# Patient Record
Sex: FEMALE | Race: WHITE | NOT HISPANIC OR LATINO | Employment: FULL TIME | URBAN - METROPOLITAN AREA
[De-identification: names, ages, dates, MRNs, and addresses within clinical notes are randomized per-mention and may not be internally consistent; named-entity substitution may affect disease eponyms.]

---

## 2017-04-07 ENCOUNTER — HOSPITAL ENCOUNTER (OUTPATIENT)
Dept: RADIOLOGY | Facility: CLINIC | Age: 22
Discharge: HOME | End: 2017-04-07
Attending: FAMILY MEDICINE

## 2017-04-07 ENCOUNTER — TRANSCRIBE ORDERS (OUTPATIENT)
Dept: URGENT CARE | Facility: CLINIC | Age: 22
End: 2017-04-07

## 2017-04-07 DIAGNOSIS — S61.011A LACERATION OF RIGHT THUMB, INITIAL ENCOUNTER: Primary | ICD-10-CM

## 2017-04-07 DIAGNOSIS — S61.011A LACERATION OF RIGHT THUMB, INITIAL ENCOUNTER: ICD-10-CM

## 2017-04-07 PROCEDURE — 73140 X-RAY EXAM OF FINGER(S): CPT

## 2020-01-30 ENCOUNTER — EVALUATION (OUTPATIENT)
Dept: PHYSICAL THERAPY | Facility: CLINIC | Age: 25
End: 2020-01-30
Payer: COMMERCIAL

## 2020-01-30 DIAGNOSIS — S82.111K CLOSED DISPLACED FRACTURE OF RIGHT TIBIAL SPINE WITH NONUNION: Primary | ICD-10-CM

## 2020-01-30 PROCEDURE — 97535 SELF CARE MNGMENT TRAINING: CPT | Performed by: PHYSICAL THERAPIST

## 2020-01-30 PROCEDURE — 97161 PT EVAL LOW COMPLEX 20 MIN: CPT | Performed by: PHYSICAL THERAPIST

## 2020-01-30 PROCEDURE — 97110 THERAPEUTIC EXERCISES: CPT | Performed by: PHYSICAL THERAPIST
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
## 2020-02-03 ENCOUNTER — OFFICE VISIT (OUTPATIENT)
Dept: PHYSICAL THERAPY | Facility: CLINIC | Age: 25
End: 2020-02-03
Payer: COMMERCIAL

## 2020-02-03 DIAGNOSIS — S82.111K CLOSED DISPLACED FRACTURE OF RIGHT TIBIAL SPINE WITH NONUNION: Primary | ICD-10-CM

## 2020-02-03 PROCEDURE — 97140 MANUAL THERAPY 1/> REGIONS: CPT | Performed by: PHYSICAL THERAPIST

## 2020-02-03 PROCEDURE — 97110 THERAPEUTIC EXERCISES: CPT | Performed by: PHYSICAL THERAPIST

## 2020-02-03 NOTE — PROGRESS NOTES
Daily Note     Today's date: 2/3/2020  Patient name: Jairo Roa  : 1995  MRN: [id number]  Referring provider: Marcia Calvin  Dx: No diagnosis found  Subjective: The patient returns after IE reporting compliance with her HEP  She denies any pain but does report soreness after IE and today  She also noted a decrease in swelling since initiating her exercise program        Objective: See treatment diary below      Assessment: The patient demonstrated good tolerance to exercises  Moderate tightness noted into dorsiflexion  Plan: Continue per plan of care        Precautions: R LE NWB and no strengthening per MD      Manual  1/30 2/3           R Ankle PROM  10 min                                                                   Exercise Diary  1/30 2/3           ABCs 1 set 2 sets           PF/DF AROM 1x20 1x20           Ev/Inv AROM 1x20 1x20           [address]   20"x4 20"x4           [address]    20"x4           PF Stretch 20"x4 20"x4           WB: DF/PF  1x30 ea           WB: Ev/Inv  1x30 ea           Towel Scrunches  np                                                                                                                                                              Modalities

## 2020-02-06 ENCOUNTER — OFFICE VISIT (OUTPATIENT)
Dept: PHYSICAL THERAPY | Facility: CLINIC | Age: 25
End: 2020-02-06
Payer: COMMERCIAL

## 2020-02-06 DIAGNOSIS — S82.111K CLOSED DISPLACED FRACTURE OF RIGHT TIBIAL SPINE WITH NONUNION: Primary | ICD-10-CM

## 2020-02-06 PROCEDURE — 97140 MANUAL THERAPY 1/> REGIONS: CPT | Performed by: PHYSICAL THERAPIST

## 2020-02-06 PROCEDURE — 97110 THERAPEUTIC EXERCISES: CPT | Performed by: PHYSICAL THERAPIST

## 2020-02-06 NOTE — PROGRESS NOTES
Daily Note     Today's date: 2020  Patient name: Dereck Fletcher  : 1995  MRN: [id number]  Referring provider: Suzan Bay  Dx: No diagnosis found  Subjective: The patient reports no increase in symptoms following last session  Her MD follow up was pushed back one week due to the patient not having a ride  Objective: See treatment diary below      Assessment: The patient good knowledge of TE program  Slightly improved PROM noted today  Plan: Continue per plan of care        Precautions: R LE NWB and no strengthening per MD      Manual  1/30 2/3 2/6          R Ankle PROM  10 min 10 min                                                                  Exercise Diary  1/30 2/3 2/6          ABCs 1 set 2 sets 2 sets          PF/DF AROM 1x20 1x20 1x30          Ev/Inv AROM 1x20 1x20 1x30          [address]   20"x4 20"x4 20"x4          [address]    20"x4 20"x4          PF Stretch 20"x4 20"x4 20"x4          WB: DF/PF  1x30 ea 1x30 ea          WB: Ev/Inv  1x30 ea 1x30 ea          Towel Scrunches  np 1x30                                                                                                                                                             Modalities

## 2020-02-10 ENCOUNTER — OFFICE VISIT (OUTPATIENT)
Dept: PHYSICAL THERAPY | Facility: CLINIC | Age: 25
End: 2020-02-10
Payer: COMMERCIAL

## 2020-02-10 DIAGNOSIS — S82.111K CLOSED DISPLACED FRACTURE OF RIGHT TIBIAL SPINE WITH NONUNION: Primary | ICD-10-CM

## 2020-02-10 PROCEDURE — 97110 THERAPEUTIC EXERCISES: CPT | Performed by: PHYSICAL THERAPIST

## 2020-02-10 NOTE — PROGRESS NOTES
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 S82 111K                   Subjective: The patient reports no new complaints  Objective: See treatment diary below      Assessment: The patient demonstrates slowly improving PROM but is still most limited into ankle dorsiflexion  Plan: Continue per plan of care        Precautions: R LE NWB and no strengthening per MD      Manual  1/30 2/3 2/6 2/10         R Ankle PROM  10 min 10 min 10 min                                                                 Exercise Diary  1/30 2/3 2/6 2/10         ABCs 1 set 2 sets 2 sets 2 sets         PF/DF AROM 1x20 1x20 1x30 1x30         Ev/Inv AROM 1x20 1x20 1x30 1x30         [address]   20"x4 20"x4 20"x4 20"x4         [address]    20"x4 20"x4 20"x4         PF Stretch 20"x4 20"x4 20"x4 20"x4         WB: DF/PF  1x30 ea 1x30 ea 1x30 ea         WB: Ev/Inv  1x30 ea 1x30 ea 1x30 ea         Towel Scrunches  np 1x30 1x40                                                                                                                                                            Modalities

## 2020-02-13 ENCOUNTER — OFFICE VISIT (OUTPATIENT)
Dept: PHYSICAL THERAPY | Facility: CLINIC | Age: 25
End: 2020-02-13
Payer: COMMERCIAL

## 2020-02-13 DIAGNOSIS — S82.111K CLOSED DISPLACED FRACTURE OF RIGHT TIBIAL SPINE WITH NONUNION: Primary | ICD-10-CM

## 2020-02-13 PROCEDURE — 97140 MANUAL THERAPY 1/> REGIONS: CPT

## 2020-02-13 PROCEDURE — 97110 THERAPEUTIC EXERCISES: CPT

## 2020-02-13 NOTE — PROGRESS NOTES
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 S82 111K                   Subjective: The patient occasional right ankle soreness, no pain currently  Objective: See treatment diary below      Assessment: Pt demonstrated moderate limited ankle DF PROM, good tolerance to tx  Plan: Continue poc as per PT       Precautions: R LE NWB and no strengthening per MD      Manual  1/30 2/3 2/6 2/10 2/13        R Ankle PROM  10 min 10 min 10 min 10 min                                                                Exercise Diary  1/30 2/3 2/6 2/10 2/13        ABCs 1 set 2 sets 2 sets 2 sets 2 sets        PF/DF AROM 1x20 1x20 1x30 1x30 1x30        Ev/Inv AROM 1x20 1x20 1x30 1x30 1x30        [address]   20"x4 20"x4 20"x4 20"x4 20"x4        [address]    20"x4 20"x4 20"x4 20"x4        PF Stretch 20"x4 20"x4 20"x4 20"x4 20"x4        WB: DF/PF  1x30 ea 1x30 ea 1x30 ea 1x30 ea        WB: Ev/Inv  1x30 ea 1x30 ea 1x30 ea 1x30 ea        Towel Scrunches  np 1x30 1x40 1x50                                                                                                                                                           Modalities

## 2020-02-17 ENCOUNTER — OFFICE VISIT (OUTPATIENT)
Dept: PHYSICAL THERAPY | Facility: CLINIC | Age: 25
End: 2020-02-17
Payer: COMMERCIAL

## 2020-02-17 DIAGNOSIS — S82.111K CLOSED DISPLACED FRACTURE OF RIGHT TIBIAL SPINE WITH NONUNION: Primary | ICD-10-CM

## 2020-02-17 PROCEDURE — 97110 THERAPEUTIC EXERCISES: CPT | Performed by: PHYSICAL THERAPIST

## 2020-02-17 NOTE — PROGRESS NOTES
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 S82 111K                   Subjective: The patient denies any soreness or complaints concerning the ankle today or over the weekend  F/u appointment is in two weeks  Objective: See treatment diary below      Assessment: Pt demonstrated continued limitations into ankle DF  Plan: Continue poc       Precautions: R LE NWB and no strengthening per MD      Manual  1/30 2/3 2/6 2/10 2/13 2/17       R Ankle PROM  10 min 10 min 10 min 10 min 10 min                                                               Exercise Diary  1/30 2/3 2/6 2/10 2/13 2/17       ABCs 1 set 2 sets 2 sets 2 sets 2 sets 2 sets       PF/DF AROM 1x20 1x20 1x30 1x30 1x30 1x30       Ev/Inv AROM 1x20 1x20 1x30 1x30 1x30 1x30       [address]   20"x4 20"x4 20"x4 20"x4 20"x4 20"x4       [address]    20"x4 20"x4 20"x4 20"x4 20"x4       PF Stretch 20"x4 20"x4 20"x4 20"x4 20"x4 20"x4       WB: DF/PF  1x30 ea 1x30 ea 1x30 ea 1x30 ea 1x30 ea       WB: Ev/Inv  1x30 ea 1x30 ea 1x30 ea 1x30 ea 1x30 ea       Towel Scrunches  np 1x30 1x40 1x50 1x60                                                                                                                                                          Modalities

## 2020-02-20 ENCOUNTER — OFFICE VISIT (OUTPATIENT)
Dept: PHYSICAL THERAPY | Facility: CLINIC | Age: 25
End: 2020-02-20
Payer: COMMERCIAL

## 2020-02-20 DIAGNOSIS — S82.111K CLOSED DISPLACED FRACTURE OF RIGHT TIBIAL SPINE WITH NONUNION: Primary | ICD-10-CM

## 2020-02-20 PROCEDURE — 97140 MANUAL THERAPY 1/> REGIONS: CPT | Performed by: PHYSICAL THERAPIST

## 2020-02-20 PROCEDURE — 97110 THERAPEUTIC EXERCISES: CPT | Performed by: PHYSICAL THERAPIST

## 2020-02-20 NOTE — PROGRESS NOTES
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 S82 111K        Start Time: 1400  Stop Time: 1430  Total time in clinic (min): 30 minutes    Subjective: The patient denies any new symptoms  She notes mild shoulder pain from using the crutches but no pain in the ankle  Objective: See treatment diary below      Assessment: Pt demonstrates good knowledge of TE program and is only limited by ankle dorsiflexion and MD restrictions  Plan: Continue poc       Precautions: R LE NWB and no strengthening per MD      Manual  1/30 2/3 2/6 2/10 2/13 2/17 2/20      R Ankle PROM  10 min 10 min 10 min 10 min 10 min 10 min      Ankle A-P Mobs       1x20 ea                                                 Exercise Diary  1/30 2/3 2/6 2/10 2/13 2/17 2/20      ABCs 1 set 2 sets 2 sets 2 sets 2 sets 2 sets 2 sets      PF/DF AROM 1x20 1x20 1x30 1x30 1x30 1x30 1x30      Ev/Inv AROM 1x20 1x20 1x30 1x30 1x30 1x30 1x30      [address]   20"x4 20"x4 20"x4 20"x4 20"x4 20"x4 20"x4      [address]    20"x4 20"x4 20"x4 20"x4 20"x4 20"x4      PF Stretch 20"x4 20"x4 20"x4 20"x4 20"x4 20"x4 20"x4      WB: DF/PF  1x30 ea 1x30 ea 1x30 ea 1x30 ea 1x30 ea 1x30 ea      WB: Ev/Inv  1x30 ea 1x30 ea 1x30 ea 1x30 ea 1x30 ea 1x30 ea      Towel Scrunches  np 1x30 1x40 1x50 1x60 1x60                                                                                                                                                         Modalities

## 2020-02-26 ENCOUNTER — OFFICE VISIT (OUTPATIENT)
Dept: PHYSICAL THERAPY | Facility: CLINIC | Age: 25
End: 2020-02-26
Payer: COMMERCIAL

## 2020-02-26 DIAGNOSIS — S82.111K CLOSED DISPLACED FRACTURE OF RIGHT TIBIAL SPINE WITH NONUNION: Primary | ICD-10-CM

## 2020-02-26 PROCEDURE — 97140 MANUAL THERAPY 1/> REGIONS: CPT | Performed by: PHYSICAL THERAPIST

## 2020-02-26 PROCEDURE — 97110 THERAPEUTIC EXERCISES: CPT | Performed by: PHYSICAL THERAPIST

## 2020-02-26 NOTE — PROGRESS NOTES
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 S82 111K                   Subjective: Pt  Will be returning to the surgeon on 3/3/20 to hopefully be cleared for weight bearing  She will be Flying to [facility] [employer]  Objective: See treatment diary below      Assessment: Pt demonstrates good understanding and technique of prescribed exercises  Plan: Continue poc       Precautions: R LE NWB and no strengthening per MD      Manual  1/30 2/3 2/6 2/10 2/13 2/17 2/20 2/25     R Ankle PROM  10 min 10 min 10 min 10 min 10 min 10 min 10 min     Ankle A-P Mobs       1x20 ea 1x20 ea                                                Exercise Diary  1/30 2/3 2/6 2/10 2/13 2/17 2/20 2/25     ABCs 1 set 2 sets 2 sets 2 sets 2 sets 2 sets 2 sets 2 sets     PF/DF AROM 1x20 1x20 1x30 1x30 1x30 1x30 1x30 1x30     Ev/Inv AROM 1x20 1x20 1x30 1x30 1x30 1x30 1x30 1x30     [address]   20"x4 20"x4 20"x4 20"x4 20"x4 20"x4 20"x4 20"x4     [address]    20"x4 20"x4 20"x4 20"x4 20"x4 20"x4 20"x4     PF Stretch 20"x4 20"x4 20"x4 20"x4 20"x4 20"x4 20"x4 20"x4     WB: DF/PF  1x30 ea 1x30 ea 1x30 ea 1x30 ea 1x30 ea 1x30 ea 1x30  ea     WB: Ev/Inv  1x30 ea 1x30 ea 1x30 ea 1x30 ea 1x30 ea 1x30 ea 1x30  ea     Towel Scrunches  np 1x30 1x40 1x50 1x60 1x60 1x60                                                                                                                                                        Modalities

## 2020-03-06 ENCOUNTER — OFFICE VISIT (OUTPATIENT)
Dept: PHYSICAL THERAPY | Facility: CLINIC | Age: 25
End: 2020-03-06
Payer: COMMERCIAL

## 2020-03-06 DIAGNOSIS — S82.111K CLOSED DISPLACED FRACTURE OF RIGHT TIBIAL SPINE WITH NONUNION: Primary | ICD-10-CM

## 2020-03-06 PROCEDURE — 97140 MANUAL THERAPY 1/> REGIONS: CPT | Performed by: PHYSICAL THERAPIST

## 2020-03-06 PROCEDURE — 97110 THERAPEUTIC EXERCISES: CPT | Performed by: PHYSICAL THERAPIST

## 2020-03-06 NOTE — PROGRESS NOTES
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 S82 111K                   Subjective: Patient cleared for standing in the CAM boot but no walking per MD via patient  The patient was unsure if she was cleared for strengthening but planned to call MD to confirm  Objective: See treatment diary below      Assessment: Pt demonstrates good tolerance to new exercises and weight-bearing through the right LE  Added weight-shift and heel/toe taps to HEP  Plan: Continue poc  Decrease to 1x/wk until cleared for more activity        Precautions: R LE NWB and no strengthening per MD      Manual  1/30 2/3 2/6 2/10 2/13 2/17 2/20 2/25 3/6    R Ankle PROM  10 min 10 min 10 min 10 min 10 min 10 min 10 min 8 min    Ankle A-P Mobs       1x20 ea 1x20 ea 1x20 ea                                               Exercise Diary  1/30 2/3 2/6 2/10 2/13 2/17 2/20 2/25 3/6    ABCs 1 set 2 sets 2 sets 2 sets 2 sets 2 sets 2 sets 2 sets 2 sets    PF/DF AROM 1x20 1x20 1x30 1x30 1x30 1x30 1x30 1x30 1x30    Ev/Inv AROM 1x20 1x20 1x30 1x30 1x30 1x30 1x30 1x30 1x30    [address]   20"x4 20"x4 20"x4 20"x4 20"x4 20"x4 20"x4 20"x4 20"x4    [address]    20"x4 20"x4 20"x4 20"x4 20"x4 20"x4 20"x4 20"x4    PF Stretch 20"x4 20"x4 20"x4 20"x4 20"x4 20"x4 20"x4 20"x4 20"x4    WB: DF/PF  1x30 ea 1x30 ea 1x30 ea 1x30 ea 1x30 ea 1x30 ea 1x30  ea 1x30 ea    WB: Ev/Inv  1x30 ea 1x30 ea 1x30 ea 1x30 ea 1x30 ea 1x30 ea 1x30  ea 1x30 ea    Towel Scrunches  np 1x30 1x40 1x50 1x60 1x60 1x60 1x70    Ankle DF on Pball         5"x20    Seated Heel/Toe Taps         2x30 ea    [employer]: Weight Shift         90% BW 5"x10                                                                                                                Modalities

## 2020-03-13 ENCOUNTER — OFFICE VISIT (OUTPATIENT)
Dept: PHYSICAL THERAPY | Facility: CLINIC | Age: 25
End: 2020-03-13
Payer: COMMERCIAL

## 2020-03-13 DIAGNOSIS — S82.111K CLOSED DISPLACED FRACTURE OF RIGHT TIBIAL SPINE WITH NONUNION: Primary | ICD-10-CM

## 2020-03-13 PROCEDURE — 97110 THERAPEUTIC EXERCISES: CPT

## 2020-03-13 PROCEDURE — 97140 MANUAL THERAPY 1/> REGIONS: CPT

## 2020-03-13 NOTE — PROGRESS NOTES
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 S82 111K                   Subjective: Patient cleared for standing in the CAM boot but no walking per MD via patient  The patient was unsure if she was cleared for strengthening but planned to call MD to confirm  Objective: See treatment diary below      Assessment: Pt demonstrates good tolerance to new exercises and weight-bearing through the right LE  Ramy Chuck Plan: Continue poc  Decrease to 1x/wk until cleared for more activity        Precautions: R LE NWB and no strengthening per MD      Manual  1/30 2/3 2/6 2/10 2/13 2/17 2/20 2/25 3/6 3/13   R Ankle PROM  10 min 10 min 10 min 10 min 10 min 10 min 10 min 8 min 8 min   Ankle A-P Mobs       1x20 ea 1x20 ea 1x20 ea                                               Exercise Diary  1/30 2/3 2/6 2/10 2/13 2/17 2/20 2/25 3/6 3/12   ABCs 1 set 2 sets 2 sets 2 sets 2 sets 2 sets 2 sets 2 sets 2 sets 2sets   PF/DF AROM 1x20 1x20 1x30 1x30 1x30 1x30 1x30 1x30 1x30 1x30   Ev/Inv AROM 1x20 1x20 1x30 1x30 1x30 1x30 1x30 1x30 1x30 1x30   [address]   20"x4 20"x4 20"x4 20"x4 20"x4 20"x4 20"x4 20"x4 20"x4 4x20"   [address]    20"x4 20"x4 20"x4 20"x4 20"x4 20"x4 20"x4 20"x4 4x20"   PF Stretch 20"x4 20"x4 20"x4 20"x4 20"x4 20"x4 20"x4 20"x4 20"x4 4x20"   WB: DF/PF  1x30 ea 1x30 ea 1x30 ea 1x30 ea 1x30 ea 1x30 ea 1x30  ea 1x30 ea 1x30 ea   WB: Ev/Inv  1x30 ea 1x30 ea 1x30 ea 1x30 ea 1x30 ea 1x30 ea 1x30  ea 1x30 ea 1x30 ea   Towel Scrunches  np 1x30 1x40 1x50 1x60 1x60 1x60 1x70 1x70   Ankle DF on Pball         5"x20 5x20"   Seated Heel/Toe Taps         2x30 ea 2x30 ea   [employer]: Weight Shift         90% BW 5"x10 90% BW 5x10" Modalities

## 2020-03-20 ENCOUNTER — APPOINTMENT (OUTPATIENT)
Dept: PHYSICAL THERAPY | Facility: CLINIC | Age: 25
End: 2020-03-20
Payer: COMMERCIAL

## 2020-04-23 ENCOUNTER — EVALUATION (OUTPATIENT)
Dept: PHYSICAL THERAPY | Facility: CLINIC | Age: 25
End: 2020-04-23
Payer: COMMERCIAL

## 2020-04-23 DIAGNOSIS — S82.111K CLOSED DISPLACED FRACTURE OF RIGHT TIBIAL SPINE WITH NONUNION: Primary | ICD-10-CM

## 2020-04-23 PROCEDURE — 97116 GAIT TRAINING THERAPY: CPT | Performed by: PHYSICAL THERAPIST

## 2020-04-23 PROCEDURE — 97110 THERAPEUTIC EXERCISES: CPT | Performed by: PHYSICAL THERAPIST

## 2020-04-23 PROCEDURE — 97140 MANUAL THERAPY 1/> REGIONS: CPT | Performed by: PHYSICAL THERAPIST

## 2020-04-27 ENCOUNTER — OFFICE VISIT (OUTPATIENT)
Dept: PHYSICAL THERAPY | Facility: CLINIC | Age: 25
End: 2020-04-27
Payer: COMMERCIAL

## 2020-04-27 DIAGNOSIS — S82.111K CLOSED DISPLACED FRACTURE OF RIGHT TIBIAL SPINE WITH NONUNION: Primary | ICD-10-CM

## 2020-04-27 PROCEDURE — 97116 GAIT TRAINING THERAPY: CPT | Performed by: PHYSICAL THERAPIST

## 2020-04-27 PROCEDURE — 97140 MANUAL THERAPY 1/> REGIONS: CPT | Performed by: PHYSICAL THERAPIST

## 2020-04-27 PROCEDURE — 97110 THERAPEUTIC EXERCISES: CPT | Performed by: PHYSICAL THERAPIST

## 2020-04-29 ENCOUNTER — APPOINTMENT (OUTPATIENT)
Dept: PHYSICAL THERAPY | Facility: CLINIC | Age: 25
End: 2020-04-29
Payer: COMMERCIAL

## 2020-04-30 ENCOUNTER — OFFICE VISIT (OUTPATIENT)
Dept: PHYSICAL THERAPY | Facility: CLINIC | Age: 25
End: 2020-04-30
Payer: COMMERCIAL

## 2020-04-30 DIAGNOSIS — S82.111K CLOSED DISPLACED FRACTURE OF RIGHT TIBIAL SPINE WITH NONUNION: Primary | ICD-10-CM

## 2020-04-30 PROCEDURE — 97110 THERAPEUTIC EXERCISES: CPT | Performed by: PHYSICAL THERAPIST

## 2020-04-30 PROCEDURE — 97140 MANUAL THERAPY 1/> REGIONS: CPT | Performed by: PHYSICAL THERAPIST

## 2020-04-30 PROCEDURE — 97116 GAIT TRAINING THERAPY: CPT | Performed by: PHYSICAL THERAPIST

## 2020-05-04 ENCOUNTER — OFFICE VISIT (OUTPATIENT)
Dept: PHYSICAL THERAPY | Facility: CLINIC | Age: 25
End: 2020-05-04
Payer: COMMERCIAL

## 2020-05-04 DIAGNOSIS — S82.111K CLOSED DISPLACED FRACTURE OF RIGHT TIBIAL SPINE WITH NONUNION: Primary | ICD-10-CM

## 2020-05-04 PROCEDURE — 97110 THERAPEUTIC EXERCISES: CPT | Performed by: PHYSICAL THERAPIST

## 2020-05-04 PROCEDURE — 97140 MANUAL THERAPY 1/> REGIONS: CPT | Performed by: PHYSICAL THERAPIST

## 2020-05-04 PROCEDURE — 97116 GAIT TRAINING THERAPY: CPT | Performed by: PHYSICAL THERAPIST

## 2020-05-07 ENCOUNTER — APPOINTMENT (OUTPATIENT)
Dept: PHYSICAL THERAPY | Facility: CLINIC | Age: 25
End: 2020-05-07
Payer: COMMERCIAL

## 2020-05-08 ENCOUNTER — OFFICE VISIT (OUTPATIENT)
Dept: PHYSICAL THERAPY | Facility: CLINIC | Age: 25
End: 2020-05-08
Payer: COMMERCIAL

## 2020-05-08 DIAGNOSIS — S82.111K CLOSED DISPLACED FRACTURE OF RIGHT TIBIAL SPINE WITH NONUNION: Primary | ICD-10-CM

## 2020-05-08 PROCEDURE — 97110 THERAPEUTIC EXERCISES: CPT | Performed by: PHYSICAL THERAPIST

## 2020-05-08 PROCEDURE — 97116 GAIT TRAINING THERAPY: CPT | Performed by: PHYSICAL THERAPIST

## 2020-05-08 PROCEDURE — 97140 MANUAL THERAPY 1/> REGIONS: CPT | Performed by: PHYSICAL THERAPIST

## 2020-05-11 ENCOUNTER — OFFICE VISIT (OUTPATIENT)
Dept: PHYSICAL THERAPY | Facility: CLINIC | Age: 25
End: 2020-05-11
Payer: COMMERCIAL

## 2020-05-11 DIAGNOSIS — S82.111K CLOSED DISPLACED FRACTURE OF RIGHT TIBIAL SPINE WITH NONUNION: Primary | ICD-10-CM

## 2020-05-11 PROCEDURE — 97116 GAIT TRAINING THERAPY: CPT | Performed by: PHYSICAL THERAPIST

## 2020-05-11 PROCEDURE — 97110 THERAPEUTIC EXERCISES: CPT | Performed by: PHYSICAL THERAPIST

## 2020-05-11 PROCEDURE — 97140 MANUAL THERAPY 1/> REGIONS: CPT | Performed by: PHYSICAL THERAPIST

## 2020-05-14 ENCOUNTER — APPOINTMENT (OUTPATIENT)
Dept: PHYSICAL THERAPY | Facility: CLINIC | Age: 25
End: 2020-05-14
Payer: COMMERCIAL

## 2020-05-15 ENCOUNTER — OFFICE VISIT (OUTPATIENT)
Dept: PHYSICAL THERAPY | Facility: CLINIC | Age: 25
End: 2020-05-15
Payer: COMMERCIAL

## 2020-05-15 DIAGNOSIS — S82.111K CLOSED DISPLACED FRACTURE OF RIGHT TIBIAL SPINE WITH NONUNION: Primary | ICD-10-CM

## 2020-05-15 PROCEDURE — 97110 THERAPEUTIC EXERCISES: CPT | Performed by: PHYSICAL THERAPIST

## 2020-05-15 PROCEDURE — 97140 MANUAL THERAPY 1/> REGIONS: CPT | Performed by: PHYSICAL THERAPIST

## 2020-05-18 ENCOUNTER — APPOINTMENT (OUTPATIENT)
Dept: PHYSICAL THERAPY | Facility: CLINIC | Age: 25
End: 2020-05-18
Payer: COMMERCIAL

## 2020-05-19 ENCOUNTER — OFFICE VISIT (OUTPATIENT)
Dept: PHYSICAL THERAPY | Facility: CLINIC | Age: 25
End: 2020-05-19
Payer: COMMERCIAL

## 2020-05-19 DIAGNOSIS — S82.111K CLOSED DISPLACED FRACTURE OF RIGHT TIBIAL SPINE WITH NONUNION: Primary | ICD-10-CM

## 2020-05-19 PROCEDURE — 97110 THERAPEUTIC EXERCISES: CPT | Performed by: PHYSICAL THERAPIST

## 2020-05-19 PROCEDURE — 97140 MANUAL THERAPY 1/> REGIONS: CPT | Performed by: PHYSICAL THERAPIST

## 2020-05-19 PROCEDURE — 97116 GAIT TRAINING THERAPY: CPT | Performed by: PHYSICAL THERAPIST

## 2020-05-21 ENCOUNTER — APPOINTMENT (OUTPATIENT)
Dept: PHYSICAL THERAPY | Facility: CLINIC | Age: 25
End: 2020-05-21
Payer: COMMERCIAL

## 2020-05-22 ENCOUNTER — OFFICE VISIT (OUTPATIENT)
Dept: PHYSICAL THERAPY | Facility: CLINIC | Age: 25
End: 2020-05-22
Payer: COMMERCIAL

## 2020-05-22 DIAGNOSIS — S82.111K CLOSED DISPLACED FRACTURE OF RIGHT TIBIAL SPINE WITH NONUNION: Primary | ICD-10-CM

## 2020-05-22 PROCEDURE — 97140 MANUAL THERAPY 1/> REGIONS: CPT | Performed by: PHYSICAL THERAPIST

## 2020-05-22 PROCEDURE — 97116 GAIT TRAINING THERAPY: CPT | Performed by: PHYSICAL THERAPIST

## 2020-05-22 PROCEDURE — 97110 THERAPEUTIC EXERCISES: CPT | Performed by: PHYSICAL THERAPIST

## 2020-05-26 ENCOUNTER — OFFICE VISIT (OUTPATIENT)
Dept: PHYSICAL THERAPY | Facility: CLINIC | Age: 25
End: 2020-05-26
Payer: COMMERCIAL

## 2020-05-26 DIAGNOSIS — S82.111K CLOSED DISPLACED FRACTURE OF RIGHT TIBIAL SPINE WITH NONUNION: Primary | ICD-10-CM

## 2020-05-26 PROCEDURE — 97110 THERAPEUTIC EXERCISES: CPT

## 2020-05-28 ENCOUNTER — APPOINTMENT (OUTPATIENT)
Dept: PHYSICAL THERAPY | Facility: CLINIC | Age: 25
End: 2020-05-28
Payer: COMMERCIAL

## 2020-06-01 ENCOUNTER — APPOINTMENT (OUTPATIENT)
Dept: PHYSICAL THERAPY | Facility: CLINIC | Age: 25
End: 2020-06-01
Payer: COMMERCIAL

## 2020-06-02 ENCOUNTER — OFFICE VISIT (OUTPATIENT)
Dept: PHYSICAL THERAPY | Facility: CLINIC | Age: 25
End: 2020-06-02
Payer: COMMERCIAL

## 2020-06-02 DIAGNOSIS — S82.111K CLOSED DISPLACED FRACTURE OF RIGHT TIBIAL SPINE WITH NONUNION: Primary | ICD-10-CM

## 2020-06-02 PROCEDURE — 97140 MANUAL THERAPY 1/> REGIONS: CPT | Performed by: PHYSICAL THERAPIST

## 2020-06-02 PROCEDURE — 97110 THERAPEUTIC EXERCISES: CPT | Performed by: PHYSICAL THERAPIST

## 2020-06-02 PROCEDURE — 97116 GAIT TRAINING THERAPY: CPT | Performed by: PHYSICAL THERAPIST

## 2020-06-04 ENCOUNTER — APPOINTMENT (OUTPATIENT)
Dept: PHYSICAL THERAPY | Facility: CLINIC | Age: 25
End: 2020-06-04
Payer: COMMERCIAL

## 2020-06-09 ENCOUNTER — OFFICE VISIT (OUTPATIENT)
Dept: PHYSICAL THERAPY | Facility: CLINIC | Age: 25
End: 2020-06-09
Payer: COMMERCIAL

## 2020-06-09 DIAGNOSIS — S82.111K CLOSED DISPLACED FRACTURE OF RIGHT TIBIAL SPINE WITH NONUNION: Primary | ICD-10-CM

## 2020-06-09 PROCEDURE — 97116 GAIT TRAINING THERAPY: CPT | Performed by: PHYSICAL THERAPIST

## 2020-06-09 PROCEDURE — 97110 THERAPEUTIC EXERCISES: CPT | Performed by: PHYSICAL THERAPIST

## 2020-06-11 ENCOUNTER — APPOINTMENT (OUTPATIENT)
Dept: PHYSICAL THERAPY | Facility: CLINIC | Age: 25
End: 2020-06-11
Payer: COMMERCIAL

## 2020-06-12 ENCOUNTER — OFFICE VISIT (OUTPATIENT)
Dept: PHYSICAL THERAPY | Facility: CLINIC | Age: 25
End: 2020-06-12
Payer: COMMERCIAL

## 2020-06-12 DIAGNOSIS — S82.111K CLOSED DISPLACED FRACTURE OF RIGHT TIBIAL SPINE WITH NONUNION: Primary | ICD-10-CM

## 2020-06-12 PROCEDURE — 97110 THERAPEUTIC EXERCISES: CPT | Performed by: PHYSICAL THERAPIST

## 2020-06-12 PROCEDURE — 97116 GAIT TRAINING THERAPY: CPT | Performed by: PHYSICAL THERAPIST

## 2020-06-12 PROCEDURE — 97140 MANUAL THERAPY 1/> REGIONS: CPT | Performed by: PHYSICAL THERAPIST

## 2020-06-15 ENCOUNTER — OFFICE VISIT (OUTPATIENT)
Dept: PHYSICAL THERAPY | Facility: CLINIC | Age: 25
End: 2020-06-15
Payer: COMMERCIAL

## 2020-06-15 DIAGNOSIS — S82.111K CLOSED DISPLACED FRACTURE OF RIGHT TIBIAL SPINE WITH NONUNION: Primary | ICD-10-CM

## 2020-06-15 PROCEDURE — 97140 MANUAL THERAPY 1/> REGIONS: CPT | Performed by: PHYSICAL THERAPIST

## 2020-06-15 PROCEDURE — 97116 GAIT TRAINING THERAPY: CPT | Performed by: PHYSICAL THERAPIST

## 2020-06-15 PROCEDURE — 97110 THERAPEUTIC EXERCISES: CPT | Performed by: PHYSICAL THERAPIST

## 2020-06-18 ENCOUNTER — APPOINTMENT (OUTPATIENT)
Dept: PHYSICAL THERAPY | Facility: CLINIC | Age: 25
End: 2020-06-18
Payer: COMMERCIAL

## 2020-06-22 ENCOUNTER — OFFICE VISIT (OUTPATIENT)
Dept: PHYSICAL THERAPY | Facility: CLINIC | Age: 25
End: 2020-06-22
Payer: COMMERCIAL

## 2020-06-22 DIAGNOSIS — S82.111K CLOSED DISPLACED FRACTURE OF RIGHT TIBIAL SPINE WITH NONUNION: Primary | ICD-10-CM

## 2020-06-22 PROCEDURE — 97140 MANUAL THERAPY 1/> REGIONS: CPT | Performed by: PHYSICAL THERAPIST

## 2020-06-22 PROCEDURE — 97110 THERAPEUTIC EXERCISES: CPT | Performed by: PHYSICAL THERAPIST

## 2020-06-22 PROCEDURE — 97116 GAIT TRAINING THERAPY: CPT | Performed by: PHYSICAL THERAPIST

## 2020-06-26 ENCOUNTER — APPOINTMENT (OUTPATIENT)
Dept: PHYSICAL THERAPY | Facility: CLINIC | Age: 25
End: 2020-06-26
Payer: COMMERCIAL

## 2020-06-29 ENCOUNTER — OFFICE VISIT (OUTPATIENT)
Dept: PHYSICAL THERAPY | Facility: CLINIC | Age: 25
End: 2020-06-29
Payer: COMMERCIAL

## 2020-06-29 DIAGNOSIS — S82.111K CLOSED DISPLACED FRACTURE OF RIGHT TIBIAL SPINE WITH NONUNION: Primary | ICD-10-CM

## 2020-06-29 PROCEDURE — 97116 GAIT TRAINING THERAPY: CPT | Performed by: PHYSICAL THERAPIST

## 2020-06-29 PROCEDURE — 97140 MANUAL THERAPY 1/> REGIONS: CPT | Performed by: PHYSICAL THERAPIST

## 2020-06-29 PROCEDURE — 97110 THERAPEUTIC EXERCISES: CPT | Performed by: PHYSICAL THERAPIST

## 2023-07-09 ENCOUNTER — APPOINTMENT (EMERGENCY)
Dept: RADIOLOGY | Facility: HOSPITAL | Age: 28
End: 2023-07-09
Payer: COMMERCIAL

## 2023-07-09 ENCOUNTER — HOSPITAL ENCOUNTER (EMERGENCY)
Facility: HOSPITAL | Age: 28
Discharge: HOME | End: 2023-07-09
Attending: EMERGENCY MEDICINE
Payer: COMMERCIAL

## 2023-07-09 VITALS
HEART RATE: 92 BPM | TEMPERATURE: 98.5 F | WEIGHT: 130.07 LBS | DIASTOLIC BLOOD PRESSURE: 69 MMHG | RESPIRATION RATE: 20 BRPM | OXYGEN SATURATION: 100 % | SYSTOLIC BLOOD PRESSURE: 111 MMHG

## 2023-07-09 DIAGNOSIS — S93.492A SPRAIN OF ANTERIOR TALOFIBULAR LIGAMENT OF LEFT ANKLE, INITIAL ENCOUNTER: Primary | ICD-10-CM

## 2023-07-09 DIAGNOSIS — S63.502A SPRAIN OF LEFT WRIST, INITIAL ENCOUNTER: ICD-10-CM

## 2023-07-09 PROCEDURE — 99284 EMERGENCY DEPT VISIT MOD MDM: CPT | Performed by: PHYSICIAN ASSISTANT

## 2023-07-09 PROCEDURE — 73610 X-RAY EXAM OF ANKLE: CPT

## 2023-07-09 PROCEDURE — 99283 EMERGENCY DEPT VISIT LOW MDM: CPT

## 2023-07-09 PROCEDURE — 73110 X-RAY EXAM OF WRIST: CPT

## 2023-07-09 RX ORDER — IBUPROFEN 600 MG/1
600 TABLET ORAL EVERY 6 HOURS PRN
Qty: 30 TABLET | Refills: 0 | Status: SHIPPED | OUTPATIENT
Start: 2023-07-09

## 2023-07-09 RX ORDER — IBUPROFEN 400 MG/1
400 TABLET ORAL ONCE
Status: COMPLETED | OUTPATIENT
Start: 2023-07-09 | End: 2023-07-09

## 2023-07-09 RX ADMIN — IBUPROFEN 400 MG: 400 TABLET ORAL at 12:25

## 2023-07-09 NOTE — ED PROVIDER NOTES
History  Chief Complaint   Patient presents with   • Fall     Was jumping off fence at 3am and landed wrong on R foot and L wrist     Patient is a 23-year-old female with past medical history significant for right-sided tibial fracture with nonunion who presents for evaluation of left wrist and left ankle pain. States that she and her friends were drinking when she jumped over a fence and landed wrong her left ankle and falling on outstretched hand on her left hand. Since then she has had severe pain in her left ankle and has been unable to weight-bear. Patient describes the pain as throbbing, sharp without radiation. The pain is severe and worsens with ambulation. The pain is constant. She has not noted any relapsing factors. The pain has not improved with over-the-counter pain relievers. Patient denies fever, chills, neck pain, numbness, stiffness. Ankle Pain  Associated symptoms: no back pain and no fever        None       History reviewed. No pertinent past medical history. Past Surgical History:   Procedure Laterality Date   • ORTHOPEDIC SURGERY         History reviewed. No pertinent family history. I have reviewed and agree with the history as documented. E-Cigarette/Vaping   • E-Cigarette Use Current Some Day User      E-Cigarette/Vaping Substances     Social History     Tobacco Use   • Smoking status: Former     Types: Cigarettes   • Smokeless tobacco: Never   Vaping Use   • Vaping Use: Some days   Substance Use Topics   • Alcohol use: Yes     Comment: social   • Drug use: Yes     Types: Marijuana       Review of Systems   Constitutional: Negative for chills and fever. HENT: Negative for ear pain and sore throat. Eyes: Negative for pain and visual disturbance. Respiratory: Negative for cough and shortness of breath. Cardiovascular: Negative for chest pain and palpitations. Gastrointestinal: Negative for abdominal pain and vomiting. Endocrine: Negative.     Genitourinary: Negative for dysuria and hematuria. Musculoskeletal: Positive for arthralgias, gait problem and joint swelling. Negative for back pain. Skin: Negative for color change and rash. Allergic/Immunologic: Negative. Neurological: Negative for seizures and syncope. Hematological: Negative. Psychiatric/Behavioral: Negative. All other systems reviewed and are negative. Physical Exam  Physical Exam  Vitals and nursing note reviewed. Constitutional:       General: She is not in acute distress. Appearance: She is well-developed. HENT:      Head: Normocephalic and atraumatic. Eyes:      Conjunctiva/sclera: Conjunctivae normal.   Cardiovascular:      Rate and Rhythm: Normal rate and regular rhythm. Heart sounds: No murmur heard. Pulmonary:      Effort: Pulmonary effort is normal. No respiratory distress. Breath sounds: Normal breath sounds. Abdominal:      Palpations: Abdomen is soft. Tenderness: There is no abdominal tenderness. Musculoskeletal:         General: Swelling and tenderness present. Right upper arm: Normal.      Left upper arm: Swelling present. Arms:       Cervical back: Neck supple. Left lower leg: Edema present. Right ankle: Normal.      Left ankle: Tenderness present over the lateral malleolus and ATF ligament. Decreased range of motion. Legs:    Skin:     General: Skin is warm and dry. Capillary Refill: Capillary refill takes less than 2 seconds. Neurological:      General: No focal deficit present. Mental Status: She is alert and oriented to person, place, and time.    Psychiatric:         Mood and Affect: Mood normal.         Vital Signs  ED Triage Vitals [07/09/23 1105]   Temperature Pulse Respirations Blood Pressure SpO2   98.5 °F (36.9 °C) 92 20 111/69 100 %      Temp Source Heart Rate Source Patient Position - Orthostatic VS BP Location FiO2 (%)   Tympanic Monitor Sitting Left arm --      Pain Score       10 - Worst Possible Pain           Vitals:    07/09/23 1105   BP: 111/69   Pulse: 92   Patient Position - Orthostatic VS: Sitting         Visual Acuity      ED Medications  Medications   ibuprofen (MOTRIN) tablet 400 mg (400 mg Oral Given 7/9/23 1225)       Diagnostic Studies  Results Reviewed     None                 XR wrist 3+ views LEFT   Final Result by Kelly Bernard MD (07/09 2234)      No acute osseous abnormality. Workstation performed: [id number]         XR ankle 3+ views LEFT   Final Result by Tahmina Bravo MD (07/09 1336)      No acute osseous abnormality. Workstation performed: [id number]                    Procedures  Splint application    Date/Time: 7/9/2023 11:51 AM    Performed by: Geremias Leiva PA-C  Authorized by: Geremias Leiva PA-C  Universal Protocol:  Consent: Verbal consent obtained. Risks and benefits: risks, benefits and alternatives were discussed  Consent given by: patient  Time out: Immediately prior to procedure a "time out" was called to verify the correct patient, procedure, equipment, support staff and site/side marked as required. Patient identity confirmed: verbally with patient      Pre-procedure details:     Sensation:  Normal  Procedure details:     Laterality:  Left    Location:  Ankle    Splint type: Ace wrap and manufactured aircast applied to left ankle. Supplies:  Elastic bandage  Post-procedure details:     Pain:  Improved    Sensation:  Normal    Skin color:  Pink    Patient tolerance of procedure: Tolerated well, no immediate complications             ED Course                                             Medical Decision Making  Sprain of left ankle and left wrist  Patient placed in Aircast, declines crutches  Follow-up with Ortho    Wrist wrapped with Ace bandage  Recommend rest ice compression elevation  Patient will follow-up with Ortho  Patient educated on red flag symptoms that would necessitate return to the ED.     Sprain of anterior talofibular ligament of left ankle, initial encounter: acute illness or injury  Sprain of left wrist, initial encounter: acute illness or injury  Amount and/or Complexity of Data Reviewed  Radiology: ordered. Risk  Prescription drug management. Disposition  Final diagnoses:   Sprain of anterior talofibular ligament of left ankle, initial encounter   Sprain of left wrist, initial encounter     Time reflects when diagnosis was documented in both MDM as applicable and the Disposition within this note     Time User Action Codes Description Comment    7/9/2023  1:48 PM Babs Knife Add [O48.138B] Sprain of anterior talofibular ligament of left ankle, initial encounter     7/9/2023  1:49 PM [address], [address] [facility] Sprain of left wrist, initial encounter       ED Disposition     ED Disposition   Discharge    Condition   Stable    Date/Time   Sun Jul 9, 2023  1:47 PM    Comment   Candice Lopes discharge to home/self care.                Follow-up Information     Follow up With Specialties Details Why Contact Info Additional Information    Shanice Wei MD  Call  As needed [address] [place] [address]  [phone]       [address] [place] [address],[address] Specialists [facility] Orthopedic Surgery Schedule an appointment as soon as possible for a visit   [address], [place] [phone] [address], [address], [facility] [address], [place], [facility] [address]    [address] Emergency Department Emergency Medicine Go to  If symptoms worsen [place]  [address] [facility] Emergency Department, [address] [facility] [address], [place], [address]          Discharge Medication List as of 7/9/2023  1:54 PM      START taking these medications    Details   ibuprofen (MOTRIN) 600 mg tablet Take 1 tablet (600 mg total) by mouth every 6 (six) hours as needed for mild pain, Starting Sun 7/9/2023, Normal                 PDMP Review     None          ED Provider  Electronically Signed by           Alona Moreno PA-C  07/12/23 7867

## 2023-07-09 NOTE — Clinical Note
William Castellano was seen and treated in our emergency department on 7/9/2023. No work until cleared by Family Doctor/Orthopedics        Diagnosis: ankle sprain/wrist sprain    [place]  . She may return on this date: If you have any questions or concerns, please don't hesitate to call.       Stephan Pickens MD    ______________________________           _______________          _______________  Hospital Representative                              Date                                Time